# Patient Record
Sex: MALE | Race: ASIAN | NOT HISPANIC OR LATINO | URBAN - METROPOLITAN AREA
[De-identification: names, ages, dates, MRNs, and addresses within clinical notes are randomized per-mention and may not be internally consistent; named-entity substitution may affect disease eponyms.]

---

## 2023-09-02 ENCOUNTER — EMERGENCY (EMERGENCY)
Facility: HOSPITAL | Age: 23
LOS: 1 days | Discharge: HOME | End: 2023-09-02
Attending: EMERGENCY MEDICINE | Admitting: EMERGENCY MEDICINE
Payer: MEDICAID

## 2023-09-02 VITALS
OXYGEN SATURATION: 95 % | TEMPERATURE: 97 F | HEART RATE: 105 BPM | SYSTOLIC BLOOD PRESSURE: 111 MMHG | DIASTOLIC BLOOD PRESSURE: 76 MMHG | RESPIRATION RATE: 18 BRPM

## 2023-09-02 DIAGNOSIS — R41.82 ALTERED MENTAL STATUS, UNSPECIFIED: ICD-10-CM
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
## 2023-09-02 DIAGNOSIS — F10.129 ALCOHOL ABUSE WITH INTOXICATION, UNSPECIFIED: ICD-10-CM

## 2023-09-02 LAB — ETHANOL SERPL-MCNC: 235 MG/DL — HIGH

## 2023-09-02 PROCEDURE — 99284 EMERGENCY DEPT VISIT MOD MDM: CPT

## 2023-09-02 RX ORDER — SODIUM CHLORIDE 9 MG/ML
1000 INJECTION INTRAMUSCULAR; INTRAVENOUS; SUBCUTANEOUS ONCE
Refills: 0 | Status: COMPLETED | OUTPATIENT
Start: 2023-09-02 | End: 2023-09-02

## 2023-09-02 RX ORDER — ONDANSETRON 8 MG/1
4 TABLET, FILM COATED ORAL ONCE
Refills: 0 | Status: COMPLETED | OUTPATIENT
Start: 2023-09-02 | End: 2023-09-02

## 2023-09-02 RX ADMIN — SODIUM CHLORIDE 1000 MILLILITER(S): 9 INJECTION INTRAMUSCULAR; INTRAVENOUS; SUBCUTANEOUS at 02:20

## 2023-09-02 RX ADMIN — ONDANSETRON 4 MILLIGRAM(S): 8 TABLET, FILM COATED ORAL at 02:20

## 2023-09-02 NOTE — ED PROVIDER NOTE - OBJECTIVE STATEMENT
22-year-old male with no known past medical history presented to the emergency room for alcohol intoxication.  According to the friend, they were at a bar, patient drank approximately 7 alcoholic beverages, states that he passed out in the barn was unable to stand up, bouncer had to carry him out.  Friend states that the bouncer hit his head against the door but no other injuries.

## 2023-09-02 NOTE — ED PROVIDER NOTE - NSFOLLOWUPINSTRUCTIONS_ED_ALL_ED_FT
Binge-Drinking Information, Adult  Binge-drinking means drinking a large amount of alcohol in a short time. This is usually 5 drinks for men or 4 drinks for women, on one occasion.    People who binge-drink do not always have an alcohol problem. However, binge-drinking can raise your risk of becoming dependent on alcohol. Becoming dependent on alcohol is called alcohol use disorder.    Binge drinking can:  Cause health problems, such as heart disease, liver disease, or cancer.  Cause problems between you and family members or friends.  Lead to legal and financial problems.  How can binge-drinking affect me?  Friends and family may notice signs of binge-drinking or alcohol use disorder before you do. Binge-drinking can put you at risk for serious health problems, including:  Accidental injuries, such as alcohol poisoning or falls.  Developing alcohol use disorder.  Violence, such as sexual assault.  STIs (sexually transmitted infections).  Mental health problems, such as anxiety or depression.  Liver disease.  Heart disease.  Cancer of the liver, colon, esophagus, breast, or mouth.  Binge-drinking can raise your risk of these problems:  Car accidents.  Problems with relationships and other social situations.  Legal or financial problems.  Unplanned pregnancies.  If you binge-drink while pregnant, you and your baby may be at risk for health problems, including:  Miscarriage.  Stillbirth.  Fetal alcohol spectrum disorders (FASDs).  Sudden infant death syndrome (SIDS).  What actions can I take to prevent binge-drinking?  A couple holding hands while walking.  A person sitting on the floor doing yoga.   Avoid drinking too much alcohol.  If you drink alcohol:  Limit how much you have to:  0–1 drink a day for women who are not pregnant.  0–2 drinks a day for men.  Know how much alcohol is in a drink. In the [place] one drink equals one 12 oz bottle of beer (355 mL), one 5 oz glass of wine (148 mL), or one 1½ oz glass of hard liquor (44 mL).  Encourage others around you not to binge-drink.  Become aware of situations and people who trigger your drinking behavior, and either avoid those or find a new way to deal with them. Find hobbies that you can do instead of drinking, such as exercising or outdoor activities.  Do not drink if:  You are pregnant or trying to become pregnant.  You plan to drive a vehicle.  You plan to use machinery, such as a  or power tool.  You have a health condition that alcohol can make worse.  You take medicines that are affected by alcohol, including prescription pain medicines.  You are recovering from alcohol use disorder.  Develop skills to manage your moods and emotions so you do not need to drink to cope with them. This may include using stress reduction techniques such as:  Deep breathing.  Meditation or yoga.  Exercise or playing sports.  Keeping a stress diary.  Listening to music.  What are the benefits of controlling my drinking?  Controlling your drinking or quitting drinking can make you feel better about your life. It can also:  Help you control your weight.  Make you more likely to get into good physical shape and stay fit.  Improve how your body processes vitamins and minerals.  Improve your health by lowering your blood pressure, cholesterol, triglycerides, and blood sugar (glucose).  Help you think more clearly and make better decisions.  Where to find support:  You can get support to stop binge-drinking from:  Your health care provider. They may recommend counseling if you drink too much.  The National Drug and Alcohol Treatment Referral Service: 8-061-367-HELP (8447)  Alcoholics Anonymous, Alcoholics Resource Center: 1-762.945.3113  Substance Abuse and Mental Health Services Administration: sama.gov  Where to find more information:  Centers for Disease Control and Prevention: cdc.gov  National [place] on Alcohol Abuse and Alcoholism: niaaa.nih.gov/alcohol  Contact a health care provider if:  You cannot control your drinking, or you think that your drinking might be out of your control.  You have unexpected physical problems that cause you distress, such as accidental injuries.  Get help right away if:  You have thoughts about hurting yourself or others.  Get help right away if you feel like you may hurt yourself or others, or have thoughts about taking your own life.  Call 831.  Call the National Suicide Prevention Lifeline at 1-755.990.4773 or 068. This is open 24 hours a day.  Text the Crisis Text Line at 227976.  Summary  Binge-drinking refers to drinking a large amount of alcohol in a short time. This is usually 5 drinks for men or 4 drinks for women, on one occasion.  Binge-drinking can lead to serious health problems, such as heart disease, liver disease, or cancer.  Binge-drinking raises your risk of developing alcohol dependence (alcohol use disorder) and social and relationship problems.  Talk with your health care provider about your drinking habits. They may recommend counseling if you drink too much.  This information is not intended to replace advice given to you by your health care provider. Make sure you discuss any questions you have with your health care provider.

## 2023-09-02 NOTE — ED PROVIDER NOTE - PATIENT PORTAL LINK FT
You can access the FollowMyHealth Patient Portal offered by [facility] by registering at the following website: [email or web address]. By joining [employer]’s FollowMyHealth portal, you will also be able to view your health information using other applications (apps) compatible with our system.

## 2023-09-02 NOTE — ED ADULT NURSE NOTE - NSFALLUNIVINTERV_ED_ALL_ED
Bed/Stretcher in lowest position, wheels locked, appropriate side rails in place/Call bell, personal items and telephone in reach/Instruct patient to call for assistance before getting out of bed/chair/stretcher/Non-slip footwear applied when patient is off stretcher/[place] to call system/Physically safe environment - no spills, clutter or unnecessary equipment/Purposeful proactive rounding/Room/bathroom lighting operational, light cord in reach

## 2023-09-02 NOTE — ED PROVIDER NOTE - PHYSICAL EXAMINATION
Gen: Pale, arousable with physical stimuli   HEENT: No stridor, Moist mucus membranes  CV: Regular rate and rhythm   Lungs: Lungs clear, No respiratory distress, No tachypnea   Abd: Non-distended, Flat abdomen  Ext: No gross deformities, No peripheral edema  Neuro: Obtunded  Skin: Pale  Psych: Unable to assess

## 2023-09-02 NOTE — ED PROVIDER NOTE - CLINICAL SUMMARY MEDICAL DECISION MAKING FREE TEXT BOX
A/P: 22-year old M with unknown PMHx presenting to E.D. for alcohol intoxication   --Patient has no visible trauma, breathing nromally.  --Plan to give IVF, zofran, and monitor for clinical sobriety

## 2023-09-02 NOTE — ED PROVIDER NOTE - PROGRESS NOTE DETAILS
At this time, patient is awake, alert, clinically sober with a steady gait.  Has family at the bedside, stable for discharge.

## 2023-09-02 NOTE — ED ADULT TRIAGE NOTE - CHIEF COMPLAINT QUOTE
pt. picked up from the street outside a club for alcohol intoxication and vomiting. Pt. actively vomiting in triage. Accompanied by friend who denies any fall or injuries.